# Patient Record
Sex: FEMALE | Race: WHITE | NOT HISPANIC OR LATINO | URBAN - METROPOLITAN AREA
[De-identification: names, ages, dates, MRNs, and addresses within clinical notes are randomized per-mention and may not be internally consistent; named-entity substitution may affect disease eponyms.]

---

## 2019-10-30 ENCOUNTER — HOSPITAL ENCOUNTER (EMERGENCY)
Facility: HOSPITAL | Age: 69
Discharge: HOME | End: 2019-10-30
Attending: EMERGENCY MEDICINE
Payer: COMMERCIAL

## 2019-10-30 VITALS
OXYGEN SATURATION: 96 % | HEIGHT: 66 IN | RESPIRATION RATE: 14 BRPM | SYSTOLIC BLOOD PRESSURE: 139 MMHG | BODY MASS INDEX: 26.52 KG/M2 | WEIGHT: 165 LBS | HEART RATE: 69 BPM | DIASTOLIC BLOOD PRESSURE: 73 MMHG | TEMPERATURE: 97.9 F

## 2019-10-30 DIAGNOSIS — R04.0 NOSEBLEED: Primary | ICD-10-CM

## 2019-10-30 PROCEDURE — 99282 EMERGENCY DEPT VISIT SF MDM: CPT

## 2019-10-30 RX ORDER — ALENDRONATE SODIUM 70 MG/1
TABLET ORAL
COMMUNITY
Start: 2019-08-15 | End: 2021-11-13

## 2019-10-30 RX ORDER — OMEGA-3-ACID ETHYL ESTERS 1 G/1
2000 CAPSULE, LIQUID FILLED ORAL DAILY
COMMUNITY
Start: 2019-10-26 | End: 2021-11-13

## 2019-10-30 RX ORDER — LEVOTHYROXINE SODIUM 100 UG/1
100 TABLET ORAL
COMMUNITY
Start: 2019-06-12

## 2019-10-30 RX ORDER — FLUOXETINE 20 MG/1
20 TABLET ORAL DAILY
COMMUNITY
Start: 2019-09-26

## 2019-10-30 RX ORDER — CHOLECALCIFEROL (VITAMIN D3) 25 MCG
1000 TABLET ORAL DAILY
COMMUNITY

## 2019-10-30 SDOH — HEALTH STABILITY: MENTAL HEALTH: HOW OFTEN DO YOU HAVE A DRINK CONTAINING ALCOHOL?: MONTHLY OR LESS

## 2019-10-30 ASSESSMENT — ENCOUNTER SYMPTOMS
NAUSEA: 0
DIZZINESS: 0
WOUND: 0
VOMITING: 0

## 2019-10-31 NOTE — ED ATTESTATION NOTE
"Physician Attestation:     I personally saw and evaluated the patient, participated in the management, and agree with the findings in the above note except as where stated.  The Physician Assistant and I discussed  the case, workup, and disposition.      Chief Complaint  Chief Complaint   Patient presents with   • Epistaxis (Nose Bleed)     pt reports having nose bleed tonight at 2100, bleeding slowed now       70 yo with epistaxis at 9 PTA. No antiplatelets or anticoags. No trauma. Not picking nose. spontenous resolution on its own     Non toxic well appeairng  No acute distress  Atraumatic   Nares w normal inspection  Stable vitals    No active bleeding or evidence of area of origination of bleeding           Vital Signs:   Visit Vitals  /73   Pulse 69   Temp 36.6 °C (97.9 °F) (Tympanic)   Resp 14   Ht 1.676 m (5' 6\")   Wt 74.8 kg (165 lb)   SpO2 96%   BMI 26.63 kg/m²     Vital Signs reviewed       Past Medical History:  Past Medical History:   Diagnosis Date   • Hypothyroidism        Past Surgical History:  History reviewed. No pertinent surgical history.    Current Medications:  No current facility-administered medications for this encounter.      Current Outpatient Medications   Medication Sig Dispense Refill   • FLUoxetine (PROzac) 20 mg tablet Take 20 mg by mouth daily.     • levothyroxine (SYNTHROID) 100 mcg tablet Take 100 mcg by mouth.     • omega-3 acid ethyl esters (LOVAZA) 1 gram capsule Take 2,000 mg by mouth daily.     • alendronate (FOSAMAX) 70 mg tablet      • cholecalciferol, vitamin D3, 1,000 unit (25 mcg) tablet Take 1,000 Units by mouth daily.         Allergies:  No Known Allergies    Family History  History reviewed. No pertinent family history.    Medical, surgical, and family history reviewed.    Social History   Social History     Socioeconomic History   • Marital status:      Spouse name: Not on file   • Number of children: Not on file   • Years of education: Not on file   • " Highest education level: Not on file   Occupational History   • Not on file   Social Needs   • Financial resource strain: Not on file   • Food insecurity:     Worry: Not on file     Inability: Not on file   • Transportation needs:     Medical: Not on file     Non-medical: Not on file   Tobacco Use   • Smoking status: Never Smoker   • Smokeless tobacco: Never Used   Substance and Sexual Activity   • Alcohol use: Yes     Frequency: Monthly or less   • Drug use: Never   • Sexual activity: Not on file   Lifestyle   • Physical activity:     Days per week: Not on file     Minutes per session: Not on file   • Stress: Not on file   Relationships   • Social connections:     Talks on phone: Not on file     Gets together: Not on file     Attends Congregational service: Not on file     Active member of club or organization: Not on file     Attends meetings of clubs or organizations: Not on file     Relationship status: Not on file   • Intimate partner violence:     Fear of current or ex partner: Not on file     Emotionally abused: Not on file     Physically abused: Not on file     Forced sexual activity: Not on file   Other Topics Concern   • Not on file   Social History Narrative   • Not on file         Records Review:  I have reviewed any available documentation of the medications, allergies, and past history for this patient. Vital signs reviewed.              Stephen Dolan,   10/30/19 8038

## 2019-10-31 NOTE — ED PROVIDER NOTES
"HPI     Chief Complaint   Patient presents with   • Epistaxis (Nose Bleed)     pt reports having nose bleed tonight at 2100, bleeding slowed now       68 y/o F presents to the Er with a nosebleed from L nare   Pt says it started 45 minutes ago while sitting at home. Applied some pressure to area which helped  No antiplatelets or anticoagulants  No injury or picking  No h/o nosebleeds    Nosebleed has resolved       History provided by:  Patient  Epistaxis (Nose Bleed)   Location:  L nare  Severity:  Moderate  Timing:  Constant  Progression:  Resolved  Chronicity:  New  Context: not anticoagulants, not aspirin use, not bleeding disorder and not nose picking    Associated symptoms: no congestion and no dizziness         Patient History     Past Medical History:   Diagnosis Date   • Hypothyroidism        History reviewed. No pertinent surgical history.    History reviewed. No pertinent family history.    Social History     Tobacco Use   • Smoking status: Never Smoker   • Smokeless tobacco: Never Used   Substance Use Topics   • Alcohol use: Yes     Frequency: Monthly or less   • Drug use: Never       Systems Reviewed from Nursing Triage:          Review of Systems     Review of Systems   HENT: Positive for nosebleeds. Negative for congestion.    Gastrointestinal: Negative for nausea and vomiting.   Skin: Negative for wound.   Neurological: Negative for dizziness.        Physical Exam     ED Triage Vitals [10/30/19 2140]   Temp Heart Rate Resp BP SpO2   36.6 °C (97.9 °F) 69 14 139/73 96 %      Temp Source Heart Rate Source Patient Position BP Location FiO2 (%) (Set)   Tympanic -- -- -- --       Pulse Ox %: 96 % (10/30/19 2207)  Pulse Ox Interpretation: Normal (10/30/19 2207)          Patient Vitals for the past 24 hrs:   BP Temp Temp src Pulse Resp SpO2 Height Weight   10/30/19 2140 139/73 36.6 °C (97.9 °F) Tympanic 69 14 96 % 1.676 m (5' 6\") 74.8 kg (165 lb)                                       Physical Exam "   Constitutional: She is oriented to person, place, and time. She appears well-developed and well-nourished. No distress.   HENT:   Nose: Nose normal. No nasal deformity. No epistaxis (bleeding resolved. no hyperemic areas or scabs/wounds or bleeding noted).   Mouth/Throat: Oropharynx is clear and moist.   Neurological: She is alert and oriented to person, place, and time.   Skin: Skin is warm and dry. She is not diaphoretic.            Procedures    ED Course & MDM     Labs Reviewed - No data to display    No orders to display               MDM         ED Course as of Oct 30 2224   Wed Oct 30, 2019   2224 No bleeding upon arrival  No source of  bleeding on examination  Will d/c home with nosebleed instructions     [AC]      ED Course User Index  [AC] Cande Angelo PA C         Clinical Impressions as of Oct 30 2224   Nosebleed        Cande Angelo PA C  10/30/19 2224

## 2019-10-31 NOTE — DISCHARGE INSTRUCTIONS
Although your nose bleed has been stopped, healing will not be complete for 48 -  72 hours. Please follow these instructions:   -Do not blow your nose for 48 hours, and then blow very gently.   - Avoid sneezing. If you must sneeze, keep your mouth open so the force of the   sneeze comes through your mouth, not your nose.   -Always keep your head up, even when tying your shoes.   -Sleep with your head up on at least two pillows.   -Do not strain when moving your bowels, bending over, or lifting heavy   objects.   -If bleeding occurs, squeeze both nostrils tightly closed for 15 minutes. If  bleeding occurs again. Attempt firm pressure again for another 15 minutes    -If you have 2 failed attempts, return to the ER   -Avoid aspirin products because they can cause you to bleed longer.   -Keep your nostrils moist by putting Vaseline jelly or saline nasal spray gently  into your nose.   -Use a vaporizer or humidifier in your bedroom at night.   -Never put a finger into your nostril.

## 2021-04-14 DIAGNOSIS — Z23 ENCOUNTER FOR IMMUNIZATION: ICD-10-CM

## 2021-11-13 ENCOUNTER — APPOINTMENT (EMERGENCY)
Dept: RADIOLOGY | Facility: HOSPITAL | Age: 71
End: 2021-11-13
Attending: EMERGENCY MEDICINE
Payer: MEDICARE

## 2021-11-13 ENCOUNTER — HOSPITAL ENCOUNTER (EMERGENCY)
Facility: HOSPITAL | Age: 71
Discharge: HOME | End: 2021-11-13
Attending: EMERGENCY MEDICINE
Payer: MEDICARE

## 2021-11-13 VITALS
DIASTOLIC BLOOD PRESSURE: 86 MMHG | RESPIRATION RATE: 18 BRPM | TEMPERATURE: 97.2 F | OXYGEN SATURATION: 96 % | SYSTOLIC BLOOD PRESSURE: 180 MMHG | HEART RATE: 57 BPM

## 2021-11-13 DIAGNOSIS — S00.03XA CONTUSION OF SCALP, INITIAL ENCOUNTER: ICD-10-CM

## 2021-11-13 DIAGNOSIS — S69.92XA INJURY OF LEFT WRIST, INITIAL ENCOUNTER: Primary | ICD-10-CM

## 2021-11-13 DIAGNOSIS — S09.90XA CLOSED HEAD INJURY, INITIAL ENCOUNTER: ICD-10-CM

## 2021-11-13 PROCEDURE — 2W3DX1Z IMMOBILIZATION OF LEFT LOWER ARM USING SPLINT: ICD-10-PCS | Performed by: EMERGENCY MEDICINE

## 2021-11-13 PROCEDURE — 70450 CT HEAD/BRAIN W/O DYE: CPT | Mod: ME

## 2021-11-13 PROCEDURE — 63700000 HC SELF-ADMINISTRABLE DRUG: Performed by: PHYSICIAN ASSISTANT

## 2021-11-13 PROCEDURE — 73110 X-RAY EXAM OF WRIST: CPT | Mod: LT

## 2021-11-13 PROCEDURE — 29125 APPL SHORT ARM SPLINT STATIC: CPT | Mod: 59,LT | Performed by: PHYSICIAN ASSISTANT

## 2021-11-13 PROCEDURE — 99284 EMERGENCY DEPT VISIT MOD MDM: CPT | Mod: 25

## 2021-11-13 RX ORDER — ACETAMINOPHEN 325 MG/1
650 TABLET ORAL ONCE
Status: COMPLETED | OUTPATIENT
Start: 2021-11-13 | End: 2021-11-13

## 2021-11-13 RX ADMIN — ACETAMINOPHEN 650 MG: 325 TABLET, FILM COATED ORAL at 18:44

## 2021-11-13 ASSESSMENT — ENCOUNTER SYMPTOMS
SHORTNESS OF BREATH: 0
NECK PAIN: 0
NAUSEA: 0
BACK PAIN: 0
BRUISES/BLEEDS EASILY: 0
HEADACHES: 1
WOUND: 1
CHILLS: 0
VOMITING: 0
DIZZINESS: 0
FEVER: 0

## 2021-11-13 NOTE — ED ATTESTATION NOTE
I have personally seen and examined the patient.  I reviewed and agree with physician assistant / nurse practitioner’s assessment and plan of care.     Exam: Patient is resting comfortably no acute distress.  Her vital signs are stable and she is afebrile.  Patient is awake alert and oriented with a grossly intact neuro exam.  HEENT exam reveals a large area of ecchymosis on the left forehead with superficial skin abrasion and no active bleeding.  The remainder of the midface is stable and unremarkable.  Patient also has some mild tenderness of the left wrist with swelling but no deformity.  Extremity is neurovascularly intact.    Plan: Patient had a trip and fall resulting in head and wrist injuries.  Will check a CT scan and x-rays to further evaluate           ChiuStephen DO  11/13/21 2804

## 2021-11-14 NOTE — DISCHARGE INSTRUCTIONS
Continue to ice as needed for swelling    Tylenol as needed for pain    Keep the splint on until seen by the hand doctor.  Call on Monday to schedule follow-up appointment.  Do not get this wet, cover when showering    Follow-up with your family doctor as well regarding head injury      Return to the emergency department if you develop any new concerning symptoms

## 2021-11-14 NOTE — ED PROVIDER NOTES
Emergency Medicine Note  HPI   HISTORY OF PRESENT ILLNESS     Patient is a 71-year-old female with history of hypothyroidism presenting with chief complaint of head injury. He states just prior to arrival she tripped over a cement step and landed on the ground hitting her forehead. She denies LOC. Patient reports she did clean her forehead prior to arrival. She also admits she landed on her left wrist reporting mild pain there. She is right-hand dominant. Patient is not on any blood thinning medications. Tdap is up-to-date.      History provided by:  Patient  Trauma  Mechanism of injury: fall     Current symptoms:       Associated symptoms:             Reports headache (slight).             Denies back pain, chest pain, nausea, neck pain and vomiting.   Head Injury  Associated symptoms: headache (slight)    Associated symptoms: no nausea, no neck pain and no vomiting          Patient History   PAST HISTORY     Reviewed from Nursing Triage:      Past Medical History:   Diagnosis Date   • Hypothyroidism        Past Surgical History:   Procedure Laterality Date   •  SECTION     • TOTAL HIP ARTHROPLASTY         History reviewed. No pertinent family history.    Social History     Tobacco Use   • Smoking status: Never Smoker   • Smokeless tobacco: Never Used   Vaping Use   • Vaping Use: Never used   Substance Use Topics   • Alcohol use: Yes     Comment: social   • Drug use: Never         Review of Systems   REVIEW OF SYSTEMS     Review of Systems   Constitutional: Negative for chills and fever.   Respiratory: Negative for shortness of breath.    Cardiovascular: Negative for chest pain.   Gastrointestinal: Negative for nausea and vomiting.   Musculoskeletal: Negative for back pain, gait problem and neck pain.   Skin: Positive for wound.   Neurological: Positive for headaches (slight). Negative for dizziness and syncope.   Hematological: Does not bruise/bleed easily.         VITALS     ED Vitals    Date/Time Temp  Pulse Resp BP SpO2 [facility]   11/13/21 1815 36.2 °C (97.2 °F) 57 18 180/86 96 % KMP                       Physical Exam   PHYSICAL EXAM     Physical Exam  Constitutional:       Appearance: She is not toxic-appearing.   HENT:      Head: Normocephalic.        Nose:      Right Nostril: No epistaxis.      Left Nostril: No epistaxis.      Mouth/Throat:      Mouth: Mucous membranes are moist. No lacerations.      Dentition: Normal dentition.   Eyes:      Extraocular Movements: Extraocular movements intact.      Pupils: Pupils are equal, round, and reactive to light.   Cardiovascular:      Rate and Rhythm: Normal rate and regular rhythm.      Pulses:           Radial pulses are 2+ on the left side.   Pulmonary:      Effort: Pulmonary effort is normal. No respiratory distress.      Breath sounds: No wheezing.   Abdominal:      General: There is no distension.      Palpations: Abdomen is soft.      Tenderness: There is no abdominal tenderness.   Musculoskeletal:      Left shoulder: No bony tenderness. Normal range of motion. Normal strength.      Left elbow: Normal range of motion. No tenderness.      Left wrist: Snuff box tenderness present. No swelling, deformity or lacerations. Normal range of motion.      Left hand: No bony tenderness. Normal range of motion. Normal strength. Normal sensation.      Cervical back: Full passive range of motion without pain, normal range of motion and neck supple. No spinous process tenderness or muscular tenderness.   Skin:     General: Skin is warm and dry.   Neurological:      General: No focal deficit present.      Mental Status: She is alert and oriented to person, place, and time. Mental status is at baseline.           PROCEDURES     Splint Application    Date/Time: 11/13/2021 8:20 PM  Performed by: Veena Gonasles PA C  Authorized by: Stephen Chiu DO     Consent:     Consent obtained:  Verbal    Consent given by:  Patient    Risks discussed:  Nerve damage, pain and vascular  damage  Injury:     Injury location:  Wrist    Wrist injury location:  L wrist    Wrist fracture type comment:  No fx seen  Pre-procedure assessment:     Neurological function: normal      Distal perfusion: normal      Range of motion: normal    Anesthesia (see MAR for exact dosages):     Anesthesia method:  None  Procedure details:     Manipulation performed: no      Immobilization:  Splint    Splint type:  Thumb spica    Supplies used:  Ortho-Glass and cotton padding (ACE)  Post-procedure details:     Neurological function: normal      Distal perfusion: normal      Range of motion comment:  Immobilization of wrist due to splint. able to range digits appropriately    Patient tolerance of procedure:  Tolerated well, no immediate complications         DATA     Results     None          Imaging Results          CT HEAD WITHOUT IV CONTRAST (Final result)  Result time 11/13/21 19:40:05    Final result                 Impression:    IMPRESSION:  1. Anterior frontal extracranial soft tissue swelling without subjacent  calvarial fracture or intracranial acute posttraumatic abnormality.  2. No evidence of acute infarct, hemorrhage, mass or mass effect.    COMMENT:    Brain parenchyma: Normal CT appearance.  Ventricles, cisterns, and sulci: Normal in size and configuration.  Intracranial arteries: Mural calcification.    Calvarium and extra cranial soft tissues: See impression.  Visualized paranasal sinuses and mastoid air cells: Clear bilaterally.                   Narrative:    STUDY:   CT examination of the head performed without intravenous contrast  following the [facility] ED standard protocol. Images reviewed in brain,  stroke, subdural and bone windows.    CT DOSE:  One or more dose reduction techniques (e.g. automated exposure  control, adjustment of the mA and/or kV according to patient size, use of  iterative reconstruction technique) utilized for this examination.    CLINICAL HISTORY: 71-year-old female  status post fall with frontal head trauma.    COMPARISON: None.                               X-RAY WRIST LEFT 3+ VIEWS (Preliminary result)  Result time 11/13/21 19:19:41    ED Interpretation    Djd, No obvious fx                              No orders to display       Scoring tools                                 ED Course & MDM   MDM / ED COURSE and CLINICAL IMPRESSIONS     [facility]    ED Course as of 11/13/21 2057   Sat Nov 13, 2021 1943 CT HEAD WITHOUT IV CONTRAST  IMPRESSION:  1. Anterior frontal extracranial soft tissue swelling without subjacent  calvarial fracture or intracranial acute posttraumatic abnormality.  2. No evidence of acute infarct, hemorrhage, mass or mass effect. [KM]   2015 Patient splinted with relief in thumb spica due to snuffbox tenderness. Discussed splint care and Ortho follow-up. In addition, wounds cleaned with Betadine and topical bacitracin applied with gauze bandage to forehead and adhesive bandage to nose. Patient given sling as well for support if needed. Her friend at bedside to take her home at this time. Patient ambulating without difficulty. Discussed supportive care with ice and Tylenol. [KM]      ED Course User Index  [KM] Veena Gonsales PA C         Clinical Impressions as of 11/13/21 2057   Injury of left wrist, initial encounter   Closed head injury, initial encounter   Contusion of scalp, initial encounter            Veena Gonsales PA C  11/13/21 2057